# Patient Record
Sex: FEMALE | Race: WHITE
[De-identification: names, ages, dates, MRNs, and addresses within clinical notes are randomized per-mention and may not be internally consistent; named-entity substitution may affect disease eponyms.]

---

## 2018-12-20 ENCOUNTER — HOSPITAL ENCOUNTER (EMERGENCY)
Dept: HOSPITAL 25 - UCCORT | Age: 78
Discharge: HOME | End: 2018-12-20
Payer: MEDICARE

## 2018-12-20 VITALS — DIASTOLIC BLOOD PRESSURE: 64 MMHG | SYSTOLIC BLOOD PRESSURE: 149 MMHG

## 2018-12-20 DIAGNOSIS — Z87.891: ICD-10-CM

## 2018-12-20 DIAGNOSIS — B02.9: ICD-10-CM

## 2018-12-20 DIAGNOSIS — L30.9: Primary | ICD-10-CM

## 2018-12-20 DIAGNOSIS — J44.9: ICD-10-CM

## 2018-12-20 DIAGNOSIS — Z88.5: ICD-10-CM

## 2018-12-20 PROCEDURE — 93005 ELECTROCARDIOGRAM TRACING: CPT

## 2018-12-20 PROCEDURE — 99202 OFFICE O/P NEW SF 15 MIN: CPT

## 2018-12-20 PROCEDURE — G0463 HOSPITAL OUTPT CLINIC VISIT: HCPCS

## 2018-12-20 NOTE — UC
Skin Complaint HPI





- HPI Summary


HPI Summary: 





chest pain x  7 days 


pain is on bilateral flank area , left more than right 


pain is getting worse over the past few days , noted a rash on 


her left flank yesterday 


no fever, no chills, hx of copd on home o2





also c/o of rash around both eyelids , the rash is itchy red 








- History of Current Complaint


Chief Complaint: UCChestPain


Time Seen by Provider: 12/20/18 12:00


Stated Complaint: CHEST PAIN/SKIN CONCERN


Hx Obtained From: Patient, Family/Caretaker


Onset/Duration: Sudden Onset, Lasting Days - 7, Still Present, Worse Since - 

past 2 days


Timing: Constant


Onset Severity: Moderate


Current Severity: Severe


Pain Intensity: 9


Pain Scale Used: 0-10 Numeric


Location: Discrete - bilateral flank area


Character: Swelling, Pruritus, Pain, Redness, Raised, Painful


Aggravating Factor(s): Touch


Alleviating Factor(s): Nothing


Associated Signs & Symptoms: Positive: Tenderness





- Allergy/Home Medications


Allergies/Adverse Reactions: 


 Allergies











Allergy/AdvReac Type Severity Reaction Status Date / Time


 


codeine Allergy Unknown headache, Verified 12/20/18 12:11





   n/v  











Home Medications: 


 Home Medications





Acetaminophen [Acetaminophen Extra Strength] 500 mg PO PRN 12/20/18 [History]


Albuterol HFA INHALER* [Ventolin HFA Inhaler*] 2 puff INH Q4H PRN 12/20/18 [

History Confirmed 12/20/18]


Fluticasone/Umeclidin/Vilanter [Trelegy Ellipta 100-62.5-25 Mcg/INH] 1 aer IN 

DAILY 12/20/18 [History Confirmed 12/20/18]


Naproxen Sodium [Aleve] 220 mg PO PRN 12/20/18 [History]











PMH/Surg Hx/FS Hx/Imm Hx


Respiratory History: COPD





- Surgical History


Surgical History: Yes


Surgery Procedure, Year, and Place: RIGHT WRIST FRACTURE REPAIR.  VEIN REMOVAL





- Family History


Known Family History: 


   Negative: Diabetes





- Social History


Alcohol Use: Rare


Substance Use Type: None


Smoking Status (MU): Former Smoker


When Did the Patient Quit Smoking/Using Tobacco: 1982





Review of Systems


All Other Systems Reviewed And Are Negative: Yes


Constitutional: Positive: Negative


Skin: Positive: Rash - left flank / periorbital area


ENT: Positive: Negative


Respiratory: Positive: Shortness Of Breath, Cough


Cardiovascular: Positive: Chest Pain


Is Patient Immunocompromised?: No





Physical Exam


Triage Information Reviewed: Yes


Appearance: Well-Appearing, No Pain Distress, Well-Nourished


Vital Signs: 


 Initial Vital Signs











Temp  98.4 F   12/20/18 12:15


 


Pulse  84   12/20/18 12:15


 


Resp  22   12/20/18 12:15


 


BP  149/64   12/20/18 12:15


 


Pulse Ox  98   12/20/18 12:15











Vital Signs Reviewed: Yes


Eye Exam: Normal


Eyes: Positive: Conjunctiva Clear


ENT: Positive: Normal ENT inspection, Hearing grossly normal, Pharynx normal


Neck: Positive: Supple, Nontender, No Lymphadenopathy


Respiratory: Positive: Chest non-tender, Lungs clear, Normal breath sounds


Cardiovascular: Positive: RRR, No Murmur, Pulses Normal


Skin: Positive: Rashes - 1. vesicular rash left flank cw shingles  2. macular 

rash both periorbital area





Diagnostics





- EKG


Cardiac Rate: NL


Cardiac Rhythm: Sinus: Normal


Ectopy: None


ST Segment: Normal





Course/Dx





- Diagnoses


Provider Diagnosis: 


 Shingles, Periorbital dermatitis








Discharge





- Sign-Out/Discharge


Documenting (check all that apply): Patient Departure


All imaging exams completed and their final reports reviewed: No Studies





- Discharge Plan


Condition: Stable


Disposition: HOME


Prescriptions: 


Ondansetron ODT TAB* [Zofran 4 MG Odt TAB*] 8 mg PO Q8H PRN #6 tab.odt


 PRN Reason: Nausea/Vomiting


Sulfacet/PredNISolone OPTH.OI* [Blephamide S.O.P.*] 1 applic BOTH EYES BEDTIME #

1 tube


ValACYclovir (*) [Valtrex 1 GM(*)] 1 gm PO TID #21 tab


Patient Education Materials:  Shingles (ED), Eczema (ED)





- Billing Disposition and Condition


Condition: STABLE


Disposition: Home